# Patient Record
Sex: FEMALE | Race: BLACK OR AFRICAN AMERICAN | URBAN - METROPOLITAN AREA
[De-identification: names, ages, dates, MRNs, and addresses within clinical notes are randomized per-mention and may not be internally consistent; named-entity substitution may affect disease eponyms.]

---

## 2022-03-02 ENCOUNTER — WEB ENCOUNTER (OUTPATIENT)
Dept: URBAN - METROPOLITAN AREA CLINIC 74 | Facility: CLINIC | Age: 32
End: 2022-03-02

## 2022-03-02 ENCOUNTER — OFFICE VISIT (OUTPATIENT)
Dept: URBAN - METROPOLITAN AREA CLINIC 74 | Facility: CLINIC | Age: 32
End: 2022-03-02
Payer: COMMERCIAL

## 2022-03-02 DIAGNOSIS — K59.01 CONSTIPATION BY DELAYED COLONIC TRANSIT: ICD-10-CM

## 2022-03-02 DIAGNOSIS — K76.9 LIVER DISEASE: ICD-10-CM

## 2022-03-02 DIAGNOSIS — R74.8 ABNORMAL LIVER ENZYMES: ICD-10-CM

## 2022-03-02 DIAGNOSIS — K76.0 FATTY LIVER: ICD-10-CM

## 2022-03-02 PROCEDURE — 99244 OFF/OP CNSLTJ NEW/EST MOD 40: CPT | Performed by: INTERNAL MEDICINE

## 2022-03-02 PROCEDURE — 99204 OFFICE O/P NEW MOD 45 MIN: CPT | Performed by: INTERNAL MEDICINE

## 2022-03-02 NOTE — PHYSICAL EXAM PSYCH:
Patient's  called to let Dr. Boss know that they will be going to Dr. Srikanth Dacosta -   Ophthalmology because that is covered more by insurance.  Please enter new referral.     normal mood with appropriate affect

## 2022-03-02 NOTE — HPI-TODAY'S VISIT:
Patient is referred by Dr. Yunier Nick.  A copy of this note will be provided for review. Abdominal pain and constipation and elevated liver tests.  32 yo F presents with Onset of symptoms were precisely very 19th.  This all started with a high fever.  Fatigue and malaise and nausea.  Subsequently came the constipation.  She had some food on the street, Chinese food, other than that there was travel involved.,  To clarify there was not travel involved.  She was at home when symptoms started..  At the onset of symptoms she saw her primary care physician, she tested negative for Covid and strep, she was given a Z-Clifford which she took for 5 days and come, which she took for 4 days only, she also had Allegra-D.  While taking the Z-Clifford she had significant fevers and felt hot.  She felt very hot.  She had headache during the onset of fever and fatigue.  She was taking ibuprofen 800 mg 3 times daily for a few days but stopped that last week before she went to the ER and had her labs below.  At no time did she take Tylenol or acetaminophen based medication.  She has taken magnesium citrate for the constipation but no additional laxatives.   Seen last week in the emergency room with abnormal liver enzymes. At that time alk phos 237 a ST3 40  bilirubin 1.2.  Recheck liver panel 2 days ago showed increasing liver tests. Alk phos 212, ,  bilirubin 1.2. Creatinine 0.78, CBC normal other than very mild and borderline anemia with hemoglobin 11.8.  Platelet normal.  Lipase normal.  hCG normal, urinalysis unremarkable. Right upper quadrant ultrasound showed a small gallbladder polyp but otherwise normal.  No stones.  CT abdomen normal. ===== EXAM:   CT ABDOMEN/PELVIS WITH IV CONTRAST(CREATININE DRAW IF NEEDED)   CLINICAL INDICATION: Abdominal pain, acute, nonlocalized.   TECHNIQUE: Following IV administration of 120 cc Omnipaque, CT scan of the abdomen and pelvis with multiplanar reformatted images generated from the data set. Dose reduction techniques were utilized.    COMPARISON: No comparison studies are available at this time.   FINDINGS:    Lower chest: No evidence of abnormality.   Liver: Normal in appearance.   Gallbladder: Normal.    Pancreas: Normal.    Spleen: Normal.   Adrenals: Normal.   Kidneys: Normal.    Gastrointestinal: The stomach, small bowel, and colon appear normal.  The appendix is normal in appearance.   Pelvis: Uterus and adnexa are normal for age. Small amount of free fluid   Vasculature: Abdominal and pelvic vasculature appears normal.   Lymph nodes: No enlarged lymph nodes.    Bones: Chronic spondylolysis L5-S1   IMPRESSION: .         .   No specific cause acute symptoms identified   Released By: BHAVNA DALTON MD 2/28/2022 11:35 PM ===== EXAM:  DH US GALLBLADDER   CLINICAL INDICATION:  elevated lfts.   TECHNIQUE:   COMPARISON: No comparisons are available at this time.   FINDINGS: 4 mm common biliary duct. The liver, right kidney and observed portions of the pancreas are unremarkable. What may be a small gallbladder polyp was seen anteriorly about 3 x 4 mm inferior to the nondependent wall. No mobile gallstones or wall thickening are seen. Gallbladder was not fully distended     IMPRESSION: .         .    There appears to be a small gallbladder polyp otherwise unremarkable. Follow-up suggested. The Results Reporting Office (F1) will complete appropriate follow-up actions based on defined processes. F1   Released By: BHAVNA DALTON MD 2/27/2022 1:44 AM =====

## 2022-03-03 ENCOUNTER — TELEPHONE ENCOUNTER (OUTPATIENT)
Dept: URBAN - METROPOLITAN AREA CLINIC 74 | Facility: CLINIC | Age: 32
End: 2022-03-03

## 2022-03-07 ENCOUNTER — TELEPHONE ENCOUNTER (OUTPATIENT)
Dept: URBAN - METROPOLITAN AREA CLINIC 74 | Facility: CLINIC | Age: 32
End: 2022-03-07

## 2022-03-09 ENCOUNTER — TELEPHONE ENCOUNTER (OUTPATIENT)
Dept: URBAN - METROPOLITAN AREA CLINIC 74 | Facility: CLINIC | Age: 32
End: 2022-03-09

## 2022-03-09 ENCOUNTER — OFFICE VISIT (OUTPATIENT)
Dept: URBAN - METROPOLITAN AREA CLINIC 74 | Facility: CLINIC | Age: 32
End: 2022-03-09
Payer: COMMERCIAL

## 2022-03-09 DIAGNOSIS — K59.01 CONSTIPATION BY DELAYED COLONIC TRANSIT: ICD-10-CM

## 2022-03-09 DIAGNOSIS — B25.1 CYTOMEGALIC INCLUSION VIRUS HEPATITIS: ICD-10-CM

## 2022-03-09 DIAGNOSIS — K76.9 LIVER DISEASE: ICD-10-CM

## 2022-03-09 DIAGNOSIS — B17.8 OTHER SPECIFIED ACUTE VIRAL HEPATITIS: ICD-10-CM

## 2022-03-09 DIAGNOSIS — R74.8 ABNORMAL LIVER ENZYMES: ICD-10-CM

## 2022-03-09 DIAGNOSIS — K76.0 FATTY LIVER: ICD-10-CM

## 2022-03-09 DIAGNOSIS — B27.09 GAMMAHERPESVIRAL MONONUCLEOSIS WITH OTHER COMPLICATIONS: ICD-10-CM

## 2022-03-09 DIAGNOSIS — R11.14 BILIOUS VOMITING WITH NAUSEA: ICD-10-CM

## 2022-03-09 DIAGNOSIS — R10.84 ABDOMINAL PAIN, GENERALIZED: ICD-10-CM

## 2022-03-09 PROBLEM — 197321007 FATTY LIVER: Status: ACTIVE | Noted: 2022-03-02

## 2022-03-09 PROBLEM — 186698009: Status: ACTIVE | Noted: 2022-03-09

## 2022-03-09 PROBLEM — 35298007: Status: ACTIVE | Noted: 2022-03-02

## 2022-03-09 PROBLEM — 235856003 LIVER DISEASE: Status: ACTIVE | Noted: 2022-03-02

## 2022-03-09 PROCEDURE — 99214 OFFICE O/P EST MOD 30 MIN: CPT | Performed by: INTERNAL MEDICINE

## 2022-03-09 NOTE — HPI-TODAY'S VISIT:
One week follow up for acute hepatitis. As of last visit (initial consult), it was my opinion she has acute hepatitis, likely viral since symptoms started suddenly 3 weeks ago. However she may also had drug-induced hepatitis from Z-Clifford which she took 4 days at the onset of symptoms, versus other etiologies such as autoimmune hepatitis. The differential diagnosis and short and long-term plan were discussed extensively with patient and her father in the room. Questions answered. Labs form last week below: ====== Hepatitis A, B, C-  Repeat , repeat ALT 1005  Bilirubin 1.1  Alk phos 219,   Ferritin 753  Iron studies and TTG normal.  IgG markedly elevated to 679, IgM elevated 432, IgA normal 279.  Alpha-1 antitrypsin pending  INR 1.0  Actin +56  AMA less than 20  CHRISTINA normal  HFE gene normal  CMV IgM positive and markedly elevated at 95  EBV antibody markedly elevated and positive greater than 160

## 2022-03-10 ENCOUNTER — TELEPHONE ENCOUNTER (OUTPATIENT)
Dept: URBAN - METROPOLITAN AREA CLINIC 92 | Facility: CLINIC | Age: 32
End: 2022-03-10

## 2022-03-10 LAB
A/G RATIO: 1
A/G RATIO: 1
AAT, DNA ANALYSIS: (no result)
ACTIN (SMOOTH MUSCLE) ANTIBODY: 56
ADDITIONAL INFORMATION:: (no result)
ALBUMIN: 4.2
ALBUMIN: 4.4
ALKALINE PHOSPHATASE: 130
ALKALINE PHOSPHATASE: 219
ALT (SGPT): 1005
ALT (SGPT): 233
ANA DIRECT: NEGATIVE
AST (SGOT): 132
AST (SGOT): 767
BILIRUBIN, TOTAL: 0.7
BILIRUBIN, TOTAL: 1.1
BUN/CREATININE RATIO: 11
BUN/CREATININE RATIO: 9
BUN: 10
BUN: 8
CALCIUM: 8.8
CALCIUM: 9.5
CARBON DIOXIDE, TOTAL: 18
CARBON DIOXIDE, TOTAL: 21
CERULOPLASMIN: 45.3
CHLORIDE: 100
CHLORIDE: 99
CREATININE: 0.93
CREATININE: 0.93
CYTOMEGALOVIRUS (CMV) AB, IGM: 95.7
EBV AB VCA, IGM: >160
EGFR: 85
EGFR: 85
FERRITIN, SERUM: 753
GGT: 379
GLOBULIN, TOTAL: 4.1
GLOBULIN, TOTAL: 4.4
GLUCOSE: 102
GLUCOSE: 76
HBSAG SCREEN: NEGATIVE
HCV AB: 0.3
HEP A AB, IGM: NEGATIVE
HEP B CORE AB, IGM: NEGATIVE
HEREDITARY  HEMOCHROMATOSIS: (no result)
HIV SCREEN 4TH GENERATION WRFX: NON REACTIVE
IMMUNOGLOBULIN A, QN, SERUM: 279
IMMUNOGLOBULIN G, QN, SERUM: 2679
IMMUNOGLOBULIN M, QN, SERUM: 432
INR: 1
INR: 1
INTERPRETATION:: (no result)
IRON BIND.CAP.(TIBC): 349
IRON SATURATION: 16
IRON: 55
Lab: (no result)
MITOCHONDRIAL (M2) ANTIBODY: <20
POTASSIUM: 4
POTASSIUM: 4.3
PROTEIN, TOTAL: 8.3
PROTEIN, TOTAL: 8.8
PROTHROMBIN TIME: 10.7
PROTHROMBIN TIME: 11
SODIUM: 135
SODIUM: 137
T-TRANSGLUTAMINASE (TTG) IGA: <2
T-TRANSGLUTAMINASE (TTG) IGG: 13
UIBC: 294

## 2022-04-06 LAB
A/G RATIO: 1.3
ALBUMIN: 4.4
ALKALINE PHOSPHATASE: 57
ALT (SGPT): 22
AST (SGOT): 22
BILIRUBIN, TOTAL: 0.8
BUN/CREATININE RATIO: 16
BUN: 13
CALCIUM: 9
CARBON DIOXIDE, TOTAL: 19
CHLORIDE: 106
CREATININE: 0.82
EGFR: 99
GLOBULIN, TOTAL: 3.4
GLUCOSE: 84
POTASSIUM: 3.7
PROTEIN, TOTAL: 7.8
SODIUM: 139

## 2024-02-19 ENCOUNTER — LAB (OUTPATIENT)
Dept: URBAN - METROPOLITAN AREA CLINIC 74 | Facility: CLINIC | Age: 34
End: 2024-02-19

## 2024-02-19 ENCOUNTER — OV EP (OUTPATIENT)
Dept: URBAN - METROPOLITAN AREA CLINIC 74 | Facility: CLINIC | Age: 34
End: 2024-02-19
Payer: COMMERCIAL

## 2024-02-19 VITALS
HEIGHT: 65 IN | OXYGEN SATURATION: 100 % | DIASTOLIC BLOOD PRESSURE: 78 MMHG | TEMPERATURE: 98.5 F | WEIGHT: 243.6 LBS | SYSTOLIC BLOOD PRESSURE: 124 MMHG | HEART RATE: 62 BPM | BODY MASS INDEX: 40.59 KG/M2

## 2024-02-19 DIAGNOSIS — R10.11 ABDOMINAL PAIN, RUQ: ICD-10-CM

## 2024-02-19 DIAGNOSIS — R63.5 WEIGHT GAIN: ICD-10-CM

## 2024-02-19 PROCEDURE — 99214 OFFICE O/P EST MOD 30 MIN: CPT | Performed by: INTERNAL MEDICINE

## 2024-02-19 NOTE — HPI-TODAY'S VISIT:
One year follow up for acute hepatitis. Seen 2023 for presumed viral hepatitis (resolved) with negative [facility] panel. As of last visit (initial consult), it was my opinion she has acute hepatitis, likely viral and CMP resolved as of 3/2023.  RUQ u/s 2023, small GB polyp less than 1cm.  She was doing well for months after that episode above. Now with RUQ dull bloating pain, intermittent, nonradiating, lasts hours, worse with fatty fried foods. Normal BM. No GERD. Not taking NSAIDs. Wt up moderately due to overeating at work.

## 2024-02-20 LAB
A/G RATIO: 1.6
ALBUMIN: 4.6
ALKALINE PHOSPHATASE: 53
ALT (SGPT): 18
AST (SGOT): 20
BILIRUBIN, TOTAL: 0.5
BUN/CREATININE RATIO: 14
BUN: 14
CALCIUM: 9.4
CARBON DIOXIDE, TOTAL: 21
CHLORIDE: 107
CREATININE: 0.99
EGFR: 78
GLOBULIN, TOTAL: 2.8
GLUCOSE: 85
HEMATOCRIT: 38.1
HEMOGLOBIN: 12.4
MCH: 27.9
MCHC: 32.5
MCV: 85.8
MPV: 12.6
PLATELET COUNT: 239
POTASSIUM: 4.7
PROTEIN, TOTAL: 7.4
RDW: 13.7
RED BLOOD CELL COUNT: 4.44
SODIUM: 138
WHITE BLOOD CELL COUNT: 9.3

## 2024-03-20 ENCOUNTER — OV EP (OUTPATIENT)
Dept: URBAN - METROPOLITAN AREA CLINIC 40 | Facility: CLINIC | Age: 34
End: 2024-03-20

## 2024-03-20 RX ORDER — DICLOFENAC SODIUM 50 MG/1
TABLET, DELAYED RELEASE ORAL
Qty: 20 TABLET | Status: ACTIVE | COMMUNITY

## 2024-03-20 RX ORDER — CYCLOBENZAPRINE HYDROCHLORIDE 10 MG/1
TABLET, FILM COATED ORAL
Qty: 14 TABLET | Status: ACTIVE | COMMUNITY

## 2024-03-20 RX ORDER — FLUCONAZOLE 150 MG/1
TAKE ONE TABLET BY MOUTH EVERY OTHER DAY TABLET ORAL
Qty: 3 UNSPECIFIED | Refills: 0 | Status: ACTIVE | COMMUNITY

## 2024-03-20 RX ORDER — CLOTRIMAZOLE AND BETAMETHASONE DIPROPIONATE 10; .5 MG/G; MG/G
APPLY TO AFFECTED AREA TWICE A DAY CREAM TOPICAL
Qty: 30 GRAM | Refills: 0 | Status: ACTIVE | COMMUNITY

## 2024-03-20 RX ORDER — METRONIDAZOLE 500 MG/1
TABLET, FILM COATED ORAL
Qty: 14 TABLET | Status: ACTIVE | COMMUNITY